# Patient Record
Sex: FEMALE | Race: WHITE
[De-identification: names, ages, dates, MRNs, and addresses within clinical notes are randomized per-mention and may not be internally consistent; named-entity substitution may affect disease eponyms.]

---

## 2022-06-11 ENCOUNTER — HOSPITAL ENCOUNTER (EMERGENCY)
Dept: HOSPITAL 75 - ER | Age: 32
Discharge: HOME | End: 2022-06-11
Payer: MEDICAID

## 2022-06-11 VITALS — SYSTOLIC BLOOD PRESSURE: 116 MMHG | DIASTOLIC BLOOD PRESSURE: 82 MMHG

## 2022-06-11 VITALS — WEIGHT: 110.01 LBS | BODY MASS INDEX: 17.68 KG/M2 | HEIGHT: 66.02 IN

## 2022-06-11 DIAGNOSIS — J06.9: Primary | ICD-10-CM

## 2022-06-11 DIAGNOSIS — Z20.822: ICD-10-CM

## 2022-06-11 PROCEDURE — 87636 SARSCOV2 & INF A&B AMP PRB: CPT

## 2022-06-11 PROCEDURE — 99283 EMERGENCY DEPT VISIT LOW MDM: CPT

## 2022-06-11 NOTE — ED COUGH/URI
General


Chief Complaint:  COVID19 Suspect/Confirmed


Stated Complaint:  PRODUCTIVE COUGH/BILAT LEG PAIN


Source:  patient


Exam Limitations:  no limitations





History of Present Illness


Date Seen by Provider:  Jun 11, 2022


Time Seen by Provider:  17:48


Initial Comments


Patient is a 31-year-old female presents ED with body aches chills fatigue co

ugh, nasal congestion for the past 3 days.  She reports a wet productive cough 

with sputum production.  No shortness of breath or chest pain.  She reports 

nasal congestion and ear popping bilateral.  She report scratchy throat.  Chills

and subjective fever at home.  She reports some leg cramping and extremity 

cramping.  She had some episodes of watery diarrhea without any blood or mucus. 

No vomiting.  No one else at home with similar symptoms.  Denies of any urinary 

symptoms.  Tolerating p.o. fluids and eating at home.  She is not up-to-date on 

her COVID influenza vaccine.  She has been taken Tylenol at home without much 

improvement denies headache, visual changes, neck pain, back pain, extremity 

swelling, bruising or redness





Allergies and Home Medications


Allergies


Coded Allergies:  


     No Known Drug Allergies (Unverified , 6/11/22)





Patient Home Medication List


Home Medication List Reviewed:  Yes


Azithromycin (Azithromycin) 250 Mg Tablet, 250 MG PO UD


   Prescribed by: KAILYN BARROW on 6/11/22 5204





Review of Systems


Review of Systems


Constitutional:  chills, malaise, weakness


EENTM:  No ear pain, No blurred vision, No double vision, No mouth pain, No 

mouth swelling


Respiratory:  cough; No short of breath, No stridor, No wheezing


Cardiovascular:  No chest pain


Gastrointestinal:  No abdominal pain; diarrhea; No nausea, No vomiting


Musculoskeletal:  No back pain, No joint pain; muscle pain


Skin:  No change in color, No change in hair/nails





All Other Systems Reviewed


Negative Unless Noted:  Yes





Physical Exam





Vital Signs - First Documented








 6/11/22





 17:39


 


Temp 36.3


 


Pulse 93


 


Resp 16


 


B/P (MAP) 116/82 (93)


 


Pulse Ox 100





Capillary Refill :


Height: '"


Weight: lbs. oz. kg;  BMI


Method:


General Appearance:  WD/WN, no apparent distress


Eyes:  Bilateral Eye Normal Inspection, Bilateral Eye PERRL, Bilateral Eye EOMI


HEENT:  PERRL/EOMI, normal ENT inspection, TMs normal, pharynx normal


Neck:  non-tender, full range of motion, supple, normal inspection


Respiratory:  chest non-tender, lungs clear, normal breath sounds, no 

respiratory distress


Cardiovascular:  regular rate, rhythm, no edema, no gallop, no JVD


Gastrointestinal:  normal bowel sounds, non tender, soft, no organomegaly


Extremities:  normal range of motion, non-tender, normal inspection, no pedal 

edema


Neurologic/Psychiatric:  CNs II-XII nml as tested, no motor/sensory deficits, 

alert, normal mood/affect, oriented x 3


Skin:  normal color, warm/dry





Progress/Results/Core Measures


Suspected Sepsis


SIRS


Temperature: 


Pulse:  


Respiratory Rate: 


 


Blood Pressure  / 


Mean:





Results/Orders


Lab Results





Laboratory Tests








Test


 6/11/22


17:43 Range/Units


 


 


Influenza Type A (RT-PCR) Not Detected  Not Detecte  


 


Influenza Type B (RT-PCR) Not Detected  Not Detecte  


 


SARS-CoV-2 RNA (RT-PCR) Not Detected  Not Detecte  








My Orders





Orders - JEREMY LALA


Covid 19 Inhouse Test (6/11/22 17:36)


Influenza A And B By Pcr (6/11/22 17:36)


Ibuprofen  Tablet (Motrin  Tablet) (6/11/22 18:00)





Medications Given in ED





Current Medications








 Medications  Dose


 Ordered  Sig/Savana


 Route  Start Time


 Stop Time Status Last Admin


Dose Admin


 


 Ibuprofen  400 mg  ONCE  ONCE


 PO  6/11/22 18:00


 6/11/22 18:01 DC 6/11/22 18:00


400 MG








Vital Signs/I&O











 6/11/22 6/11/22





 17:39 18:39


 


Temp 36.3 36.3


 


Pulse 93 93


 


Resp 16 16


 


B/P (MAP) 116/82 (93) 116/82


 


Pulse Ox 100 100





Capillary Refill :





Departure


Communication (PCP)


Patient appears in no acute distress.  Vital signs stable.  Afebrile.  Patient 

with flulike symptoms.  Started 3 days ago.  She has some mild fluid behind 

bilateral TMs without evidence of otitis media.  Oropharynx patent without swell

ing or exudate.  No cervical adenopathy.  Lung sounds clear bilateral without 

wheezing, rhonchi or crackles.  Soft abdomen.  She refused any lab work or IV 

fluids at this time.  She states she is tolerating fluids.  COVID influenza 

negative.  Patient without any known medical problems.  Not up-to-date of COVID 

or flu.  Symptoms appear to be viral in nature.  Discussed conservative 

treatment with Tylenol ibuprofen at home.  Over-the-counter cough medication as 

needed.  If any worsening symptoms return back to ED for further evaluation.  

Patient states she has a history of bronchitis and requesting antibiotic if 

coughing progresses.  She does have a history of smoking.  Denies history of 

COPD or asthma.  She has no wheezing on exam.  Will discharge azithromycin if 

symptoms worsen such as cough.  Recommend waiting mid to late next week if 

symptoms progress.  No evidence suggesting pneumonia on exam.





Impression





   Primary Impression:  


   Upper respiratory infection


Disposition:  01 HOME, SELF-CARE


Condition:  Stable





Departure-Patient Inst.


Decision time for Depature:  18:32


Referrals:  


[facility] [place]





NO,LOCAL PHYSICIAN (PCP)


Primary Care Physician


Patient Instructions:  Viral Upper Respiratory Infection, Adult (DC)


Scripts


Azithromycin (Azithromycin) 250 Mg Tablet


250 MG PO UD, #6 TAB


   TAKE 2 TABLETS ON DAY ONE THEN TAKE 1 TABLET DAILY FOR FOUR MORE DAYS


   Prov: JEREMY LALA         6/11/22











JEREMY LALA          Jun 11, 2022 17:50